# Patient Record
Sex: FEMALE | Race: WHITE | NOT HISPANIC OR LATINO | Employment: OTHER | URBAN - NONMETROPOLITAN AREA
[De-identification: names, ages, dates, MRNs, and addresses within clinical notes are randomized per-mention and may not be internally consistent; named-entity substitution may affect disease eponyms.]

---

## 2023-08-31 ENCOUNTER — OFFICE VISIT (OUTPATIENT)
Dept: CARDIOLOGY | Facility: CLINIC | Age: 73
End: 2023-08-31
Payer: MEDICARE

## 2023-08-31 VITALS
BODY MASS INDEX: 35.75 KG/M2 | WEIGHT: 201.8 LBS | SYSTOLIC BLOOD PRESSURE: 157 MMHG | DIASTOLIC BLOOD PRESSURE: 79 MMHG | RESPIRATION RATE: 18 BRPM | HEIGHT: 63 IN | OXYGEN SATURATION: 95 % | HEART RATE: 54 BPM

## 2023-08-31 DIAGNOSIS — I10 PRIMARY HYPERTENSION: Primary | ICD-10-CM

## 2023-08-31 DIAGNOSIS — R00.1 BRADYCARDIA, SINUS: ICD-10-CM

## 2023-08-31 DIAGNOSIS — R60.0 LOWER EXTREMITY EDEMA: ICD-10-CM

## 2023-08-31 PROCEDURE — 3078F DIAST BP <80 MM HG: CPT | Performed by: PHYSICIAN ASSISTANT

## 2023-08-31 PROCEDURE — 93000 ELECTROCARDIOGRAM COMPLETE: CPT | Performed by: PHYSICIAN ASSISTANT

## 2023-08-31 PROCEDURE — 99204 OFFICE O/P NEW MOD 45 MIN: CPT | Performed by: PHYSICIAN ASSISTANT

## 2023-08-31 PROCEDURE — 3077F SYST BP >= 140 MM HG: CPT | Performed by: PHYSICIAN ASSISTANT

## 2023-08-31 RX ORDER — ALPRAZOLAM 0.5 MG/1
1 TABLET ORAL EVERY 12 HOURS SCHEDULED
COMMUNITY
Start: 2023-08-13

## 2023-08-31 RX ORDER — NEBIVOLOL 10 MG/1
10 TABLET ORAL EVERY 12 HOURS SCHEDULED
COMMUNITY
Start: 2023-06-13

## 2023-08-31 RX ORDER — LANOLIN ALCOHOL/MO/W.PET/CERES
500 CREAM (GRAM) TOPICAL DAILY
COMMUNITY
Start: 2023-07-03

## 2023-08-31 RX ORDER — HYDRALAZINE HYDROCHLORIDE 50 MG/1
1 TABLET, FILM COATED ORAL EVERY 12 HOURS SCHEDULED
COMMUNITY
Start: 2023-06-09

## 2023-08-31 RX ORDER — LISINOPRIL 20 MG/1
1 TABLET ORAL EVERY 12 HOURS SCHEDULED
COMMUNITY
Start: 2023-06-09

## 2023-08-31 RX ORDER — TRAMADOL HYDROCHLORIDE 50 MG/1
50 TABLET ORAL EVERY 6 HOURS PRN
COMMUNITY
Start: 2023-08-12

## 2023-08-31 RX ORDER — HYDRALAZINE HYDROCHLORIDE 25 MG/1
1 TABLET, FILM COATED ORAL EVERY 12 HOURS SCHEDULED
COMMUNITY
Start: 2023-06-16

## 2023-08-31 RX ORDER — SOLIFENACIN SUCCINATE 10 MG/1
10 TABLET, FILM COATED ORAL DAILY
COMMUNITY
Start: 2023-06-29

## 2023-08-31 NOTE — PROGRESS NOTES
Subjective   Leticia Diaz is a 73 y.o. female     Chief Complaint   Patient presents with    Hypertension       HPI    Patient is a 54-year-old female who presents to the office to be evaluated.    Patient is doing remarkably well.  She recently moved to the area approximately a year and a half ago.  She is from [place].  She seen cardiology out there and she brings records in which she had a stress test showing no evidence of ischemia and preserved LV function.    She has had issues with her blood pressure and is on a regimen to include Bystolic, lisinopril and hydralazine.  It appears that she has tried other medications in the past.  Hydralazine seems to have worked in regards to regulating her blood pressure at least helping it.  She describes that she has some issues with pain and symptoms that will increase her blood pressure but she overall is doing well.    She has no chest pain or pressure.  She recently describes having influenza but does not describe any severe or progressive shortness of breath.  No PND or orthopnea.    She does not describe palpitations nor does she complain of dysrhythmic symptoms.  She otherwise is stable.      Current Outpatient Medications   Medication Sig Dispense Refill    ALPRAZolam (XANAX) 0.5 MG tablet Take 1 tablet by mouth Every 12 (Twelve) Hours.      hydrALAZINE (APRESOLINE) 25 MG tablet Take 1 tablet by mouth Every 12 (Twelve) Hours.      hydrALAZINE (APRESOLINE) 50 MG tablet Take 1 tablet by mouth Every 12 (Twelve) Hours.      lisinopril (PRINIVIL,ZESTRIL) 20 MG tablet Take 1 tablet by mouth Every 12 (Twelve) Hours.      Multiple Vitamins-Minerals (Multivitamin Adult, Minerals,) tablet Take 1 tablet by mouth Daily.      nebivolol (BYSTOLIC) 10 MG tablet Take 1 tablet by mouth Every 12 (Twelve) Hours.      solifenacin (VESICARE) 10 MG tablet Take 1 tablet by mouth Daily.      traMADol (ULTRAM) 50 MG tablet Take 1 tablet by mouth Every 6 (Six) Hours As Needed.       "vitamin B-12 (CYANOCOBALAMIN) 1000 MCG tablet Take 0.5 tablets by mouth Daily.       No current facility-administered medications for this visit.       Edarbi [azilsartan] and Nitrolingual [nitroglycerin]    Past Medical History:   Diagnosis Date    Bunion     Gastric bypass status for obesity     Kidney stones        Social History     Socioeconomic History    Marital status:    Tobacco Use    Smoking status: Never    Smokeless tobacco: Never   Vaping Use    Vaping Use: Never used   Substance and Sexual Activity    Alcohol use: Yes     Alcohol/week: 1.0 standard drink     Types: 1 Glasses of wine per week     Comment: occasionally    Drug use: Never    Sexual activity: Defer       Family History   Problem Relation Age of Onset    Hypertension Mother     Cancer Father     Hypertension Maternal Aunt     Hypertension Maternal Uncle     Hypertension Maternal Grandmother     Hypertension Maternal Grandfather        Review of Systems   Constitutional:  Positive for chills and fatigue (flue recently).   HENT: Negative.     Eyes: Negative.    Respiratory: Negative.     Cardiovascular: Negative.    Gastrointestinal: Negative.    Endocrine: Negative.    Genitourinary:  Positive for difficulty urinating, frequency and urgency.   Musculoskeletal:  Positive for arthralgias.   Skin: Negative.    Allergic/Immunologic: Negative.    Neurological: Negative.    Hematological: Negative.    Psychiatric/Behavioral: Negative.       Objective   Vitals:    08/31/23 1100   BP: 157/79   BP Location: Left arm   Patient Position: Sitting   Cuff Size: Adult   Pulse: 54   Resp: 18   SpO2: 95%   Weight: 91.5 kg (201 lb 12.8 oz)   Height: 158.8 cm (62.5\")      /79 (BP Location: Left arm, Patient Position: Sitting, Cuff Size: Adult)   Pulse 54   Resp 18   Ht 158.8 cm (62.5\")   Wt 91.5 kg (201 lb 12.8 oz)   SpO2 95%   BMI 36.32 kg/mý     Lab Results (most recent)       None            Physical Exam  Vitals and nursing note " reviewed.   Constitutional:       General: She is not in acute distress.     Appearance: Normal appearance. She is well-developed.   HENT:      Head: Normocephalic and atraumatic.   Eyes:      General: No scleral icterus.        Right eye: No discharge.         Left eye: No discharge.      Conjunctiva/sclera: Conjunctivae normal.   Neck:      Vascular: No carotid bruit.   Cardiovascular:      Rate and Rhythm: Normal rate and regular rhythm.      Heart sounds: Normal heart sounds. No murmur heard.    No friction rub. No gallop.   Pulmonary:      Effort: Pulmonary effort is normal. No respiratory distress.      Breath sounds: Normal breath sounds. No wheezing or rales.   Chest:      Chest wall: No tenderness.   Musculoskeletal:      Right lower leg: Edema present.      Left lower leg: Edema present.   Skin:     General: Skin is warm and dry.      Coloration: Skin is not pale.      Findings: No erythema or rash.   Neurological:      Mental Status: She is alert and oriented to person, place, and time.      Cranial Nerves: No cranial nerve deficit.   Psychiatric:         Behavior: Behavior normal.       Procedure     ECG 12 Lead    Date/Time: 8/31/2023 11:12 AM  Performed by: Solomon Mason PA  Authorized by: Solomon Mason PA   Comparison: not compared with previous ECG   Comments: EKG demonstrates sinus bradycardia 52 bpm, left axis deviation, possible septal wall MI age undetermined and no acute ST changes             Assessment & Plan     Problems Addressed this Visit          Cardiac and Vasculature    Bradycardia, sinus    Relevant Medications    nebivolol (BYSTOLIC) 10 MG tablet    Primary hypertension - Primary    Relevant Medications    nebivolol (BYSTOLIC) 10 MG tablet    hydrALAZINE (APRESOLINE) 25 MG tablet    hydrALAZINE (APRESOLINE) 50 MG tablet    lisinopril (PRINIVIL,ZESTRIL) 20 MG tablet       Symptoms and Signs    Lower extremity edema     Diagnoses         Codes Comments    Primary hypertension     -  Primary ICD-10-CM: I10  ICD-9-CM: 401.9     Lower extremity edema     ICD-10-CM: R60.0  ICD-9-CM: 782.3     Bradycardia, sinus     ICD-10-CM: R00.1  ICD-9-CM: 427.89               Advance Care Planning   ACP discussion was held with the patient during this visit. Patient has an advance directive (not in EMR), copy requested.      Recommendation  1.  Patient is a 73-year-old female who presents to the office to be evaluated.  She has done relatively well.  She has no angina no anginal equivalent symptoms.  For now, with recent stress test within the last year showing no evidence of ischemia, we will continue medical therapy.    2. Patient with baseline hypertension.  It is slightly elevated today but she is monitoring closely at home.  We will continue current medical regimen.    3.  She does have bradycardia noted today but likely from the Bystolic.  She does not describe symptoms of cerebral hypoperfusion.  We will monitor.    4.  Patient with very trace edema on exam.  I feel it is likely related to venous insufficiency.  We will monitor for now.  We can see him back for follow-up in a year or sooner as symptoms discussed.  Follow-up with primary as scheduled.         Leticia Diaz  reports that she has never smoked. She has never used smokeless tobacco.. I have educated her on the risk of diseases from using tobacco             Electronically signed by:

## 2023-08-31 NOTE — LETTER
August 31, 2023       No Recipients    Patient: Leticia Diaz   YOB: 1950   Date of Visit: 8/31/2023       Dear Colby Hinton MD    Leticia Diaz was in my office today. Below is a copy of my note.    If you have questions, please do not hesitate to call me. I look forward to following Leticia along with you.         Sincerely,        BENITA Fish        CC:   No Recipients    Subjective  Leticia Diaz is a 73 y.o. female     Chief Complaint   Patient presents with    Hypertension       HPI    Patient is a 54-year-old female who presents to the office to be evaluated.    Patient is doing remarkably well.  She recently moved to the area approximately a year and a half ago.  She is from [place].  She seen cardiology out there and she brings records in which she had a stress test showing no evidence of ischemia and preserved LV function.    She has had issues with her blood pressure and is on a regimen to include Bystolic, lisinopril and hydralazine.  It appears that she has tried other medications in the past.  Hydralazine seems to have worked in regards to regulating her blood pressure at least helping it.  She describes that she has some issues with pain and symptoms that will increase her blood pressure but she overall is doing well.    She has no chest pain or pressure.  She recently describes having influenza but does not describe any severe or progressive shortness of breath.  No PND or orthopnea.    She does not describe palpitations nor does she complain of dysrhythmic symptoms.  She otherwise is stable.      Current Outpatient Medications   Medication Sig Dispense Refill    ALPRAZolam (XANAX) 0.5 MG tablet Take 1 tablet by mouth Every 12 (Twelve) Hours.      hydrALAZINE (APRESOLINE) 25 MG tablet Take 1 tablet by mouth Every 12 (Twelve) Hours.      hydrALAZINE (APRESOLINE) 50 MG tablet Take 1 tablet by mouth Every 12 (Twelve) Hours.      lisinopril (PRINIVIL,ZESTRIL) 20 MG  tablet Take 1 tablet by mouth Every 12 (Twelve) Hours.      Multiple Vitamins-Minerals (Multivitamin Adult, Minerals,) tablet Take 1 tablet by mouth Daily.      nebivolol (BYSTOLIC) 10 MG tablet Take 1 tablet by mouth Every 12 (Twelve) Hours.      solifenacin (VESICARE) 10 MG tablet Take 1 tablet by mouth Daily.      traMADol (ULTRAM) 50 MG tablet Take 1 tablet by mouth Every 6 (Six) Hours As Needed.      vitamin B-12 (CYANOCOBALAMIN) 1000 MCG tablet Take 0.5 tablets by mouth Daily.       No current facility-administered medications for this visit.       Edarbi [azilsartan] and Nitrolingual [nitroglycerin]    Past Medical History:   Diagnosis Date    Bunion     Gastric bypass status for obesity     Kidney stones        Social History     Socioeconomic History    Marital status:    Tobacco Use    Smoking status: Never    Smokeless tobacco: Never   Vaping Use    Vaping Use: Never used   Substance and Sexual Activity    Alcohol use: Yes     Alcohol/week: 1.0 standard drink     Types: 1 Glasses of wine per week     Comment: occasionally    Drug use: Never    Sexual activity: Defer       Family History   Problem Relation Age of Onset    Hypertension Mother     Cancer Father     Hypertension Maternal Aunt     Hypertension Maternal Uncle     Hypertension Maternal Grandmother     Hypertension Maternal Grandfather        Review of Systems   Constitutional:  Positive for chills and fatigue (flue recently).   HENT: Negative.     Eyes: Negative.    Respiratory: Negative.     Cardiovascular: Negative.    Gastrointestinal: Negative.    Endocrine: Negative.    Genitourinary:  Positive for difficulty urinating, frequency and urgency.   Musculoskeletal:  Positive for arthralgias.   Skin: Negative.    Allergic/Immunologic: Negative.    Neurological: Negative.    Hematological: Negative.    Psychiatric/Behavioral: Negative.       Objective  Vitals:    08/31/23 1100   BP: 157/79   BP Location: Left arm  "  Patient Position: Sitting   Cuff Size: Adult   Pulse: 54   Resp: 18   SpO2: 95%   Weight: 91.5 kg (201 lb 12.8 oz)   Height: 158.8 cm (62.5\")      /79 (BP Location: Left arm, Patient Position: Sitting, Cuff Size: Adult)   Pulse 54   Resp 18   Ht 158.8 cm (62.5\")   Wt 91.5 kg (201 lb 12.8 oz)   SpO2 95%   BMI 36.32 kg/mý     Lab Results (most recent)       None            Physical Exam  Vitals and nursing note reviewed.   Constitutional:       General: She is not in acute distress.     Appearance: Normal appearance. She is well-developed.   HENT:      Head: Normocephalic and atraumatic.   Eyes:      General: No scleral icterus.        Right eye: No discharge.         Left eye: No discharge.      Conjunctiva/sclera: Conjunctivae normal.   Neck:      Vascular: No carotid bruit.   Cardiovascular:      Rate and Rhythm: Normal rate and regular rhythm.      Heart sounds: Normal heart sounds. No murmur heard.    No friction rub. No gallop.   Pulmonary:      Effort: Pulmonary effort is normal. No respiratory distress.      Breath sounds: Normal breath sounds. No wheezing or rales.   Chest:      Chest wall: No tenderness.   Musculoskeletal:      Right lower leg: Edema present.      Left lower leg: Edema present.   Skin:     General: Skin is warm and dry.      Coloration: Skin is not pale.      Findings: No erythema or rash.   Neurological:      Mental Status: She is alert and oriented to person, place, and time.      Cranial Nerves: No cranial nerve deficit.   Psychiatric:         Behavior: Behavior normal.       Procedure    ECG 12 Lead    Date/Time: 8/31/2023 11:12 AM  Performed by: Solomon Mason PA  Authorized by: Solomon Mason PA   Comparison: not compared with previous ECG   Comments: EKG demonstrates sinus bradycardia 52 bpm, left axis deviation, possible septal wall MI age undetermined and no acute ST changes             Assessment & Plan    Problems Addressed this Visit          Cardiac and " Vasculature    Bradycardia, sinus    Relevant Medications    nebivolol (BYSTOLIC) 10 MG tablet    Primary hypertension - Primary    Relevant Medications    nebivolol (BYSTOLIC) 10 MG tablet    hydrALAZINE (APRESOLINE) 25 MG tablet    hydrALAZINE (APRESOLINE) 50 MG tablet    lisinopril (PRINIVIL,ZESTRIL) 20 MG tablet       Symptoms and Signs    Lower extremity edema     Diagnoses         Codes Comments    Primary hypertension    -  Primary ICD-10-CM: I10  ICD-9-CM: 401.9     Lower extremity edema     ICD-10-CM: R60.0  ICD-9-CM: 782.3     Bradycardia, sinus     ICD-10-CM: R00.1  ICD-9-CM: 427.89               Advance Care Planning   ACP discussion was held with the patient during this visit. Patient has an advance directive (not in EMR), copy requested.      Recommendation  1.  Patient is a 73-year-old female who presents to the office to be evaluated.  She has done relatively well.  She has no angina no anginal equivalent symptoms.  For now, with recent stress test within the last year showing no evidence of ischemia, we will continue medical therapy.    2. Patient with baseline hypertension.  It is slightly elevated today but she is monitoring closely at home.  We will continue current medical regimen.    3.  She does have bradycardia noted today but likely from the Bystolic.  She does not describe symptoms of cerebral hypoperfusion.  We will monitor.    4.  Patient with very trace edema on exam.  I feel it is likely related to venous insufficiency.  We will monitor for now.  We can see him back for follow-up in a year or sooner as symptoms discussed.  Follow-up with primary as scheduled.         Leticia Emily  reports that she has never smoked. She has never used smokeless tobacco.. I have educated her on the risk of diseases from using tobacco             Electronically signed by:

## 2024-09-03 ENCOUNTER — OFFICE VISIT (OUTPATIENT)
Dept: CARDIOLOGY | Facility: CLINIC | Age: 74
End: 2024-09-03
Payer: MEDICARE

## 2024-09-03 VITALS
HEIGHT: 63 IN | HEART RATE: 63 BPM | BODY MASS INDEX: 37.74 KG/M2 | DIASTOLIC BLOOD PRESSURE: 85 MMHG | WEIGHT: 213 LBS | SYSTOLIC BLOOD PRESSURE: 147 MMHG

## 2024-09-03 DIAGNOSIS — R60.0 LOWER EXTREMITY EDEMA: ICD-10-CM

## 2024-09-03 DIAGNOSIS — I10 PRIMARY HYPERTENSION: Primary | ICD-10-CM

## 2024-09-03 DIAGNOSIS — R06.02 SHORTNESS OF BREATH: ICD-10-CM

## 2024-09-03 PROCEDURE — 3079F DIAST BP 80-89 MM HG: CPT | Performed by: PHYSICIAN ASSISTANT

## 2024-09-03 PROCEDURE — 3077F SYST BP >= 140 MM HG: CPT | Performed by: PHYSICIAN ASSISTANT

## 2024-09-03 PROCEDURE — 99213 OFFICE O/P EST LOW 20 MIN: CPT | Performed by: PHYSICIAN ASSISTANT

## 2024-09-03 RX ORDER — NEBIVOLOL 5 MG/1
5 TABLET ORAL 2 TIMES DAILY
COMMUNITY

## 2024-09-03 RX ORDER — PREGABALIN 75 MG/1
75 CAPSULE ORAL DAILY
COMMUNITY

## 2024-09-03 RX ORDER — FUROSEMIDE 40 MG
40 TABLET ORAL DAILY
COMMUNITY

## 2024-09-03 RX ORDER — AMLODIPINE BESYLATE 10 MG/1
10 TABLET ORAL DAILY
COMMUNITY

## 2024-09-03 RX ORDER — ASPIRIN 81 MG/1
81 TABLET ORAL DAILY
COMMUNITY

## 2024-09-03 NOTE — PROGRESS NOTES
Problem list     Subjective   Leticia Diaz is a 74 y.o. female     Chief Complaint   Patient presents with    Yearly follow up     Primary hypertension       HPI      Patient is a 74-year-old female presenting back to the office for follow-up.  She has done relatively well over the last year.  She had seen cardiology approximately 3 to 4 years ago and had a stress and echocardiogram in [place].  It apparently was unremarkable.    She has been monitored through the office having moved to this area and she does remarkably well.  She has no chest pain or pressure.  She does have a degree of mild dyspnea at baseline.  No PND or orthopnea.    She does have a degree of edema.  That is managed on Lasix.  She feels relatively stable at this time.    Current Outpatient Medications on File Prior to Visit   Medication Sig Dispense Refill    ALPRAZolam (XANAX) 0.5 MG tablet Take 1 tablet by mouth Every 12 (Twelve) Hours.      amLODIPine (NORVASC) 10 MG tablet Take 1 tablet by mouth Daily.      aspirin 81 MG EC tablet Take 1 tablet by mouth Daily.      D-MANNOSE PO Take  by mouth.      furosemide (LASIX) 40 MG tablet Take 1 tablet by mouth Daily.      lisinopril (PRINIVIL,ZESTRIL) 20 MG tablet Take 1 tablet by mouth Every 12 (Twelve) Hours.      Multiple Vitamins-Minerals (Multivitamin Adult, Minerals,) tablet Take 1 tablet by mouth Daily.      nebivolol (BYSTOLIC) 10 MG tablet Take 1 tablet by mouth Every 12 (Twelve) Hours.      nebivolol (BYSTOLIC) 5 MG tablet Take 1 tablet by mouth 2 (Two) Times a Day.      pregabalin (LYRICA) 75 MG capsule Take 1 capsule by mouth Daily.      solifenacin (VESICARE) 10 MG tablet Take 1 tablet by mouth Daily.      traMADol (ULTRAM) 50 MG tablet Take 1 tablet by mouth Every 6 (Six) Hours As Needed.      vitamin B-12 (CYANOCOBALAMIN) 1000 MCG tablet Take 0.5 tablets by mouth Daily.      [DISCONTINUED] hydrALAZINE (APRESOLINE) 25 MG tablet Take 1 tablet by mouth Every 12 (Twelve) Hours.       [DISCONTINUED] hydrALAZINE (APRESOLINE) 50 MG tablet Take 1 tablet by mouth Every 12 (Twelve) Hours.       No current facility-administered medications on file prior to visit.       Edarbi [azilsartan] and Nitrolingual [nitroglycerin]    Past Medical History:   Diagnosis Date    Bunion     Gastric bypass status for obesity     Kidney stones        Social History     Socioeconomic History    Marital status:    Tobacco Use    Smoking status: Never    Smokeless tobacco: Never   Vaping Use    Vaping status: Never Used   Substance and Sexual Activity    Alcohol use: Yes     Alcohol/week: 1.0 standard drink of alcohol     Types: 1 Glasses of wine per week     Comment: occasionally    Drug use: Never    Sexual activity: Defer       Family History   Problem Relation Age of Onset    Hypertension Mother     Cancer Father     Hypertension Maternal Aunt     Hypertension Maternal Uncle     Hypertension Maternal Grandmother     Hypertension Maternal Grandfather        Review of Systems   Constitutional:  Positive for fatigue. Negative for activity change, appetite change, chills and fever.   HENT: Negative.  Negative for congestion, sinus pressure and sinus pain.    Eyes: Negative.  Negative for visual disturbance.   Respiratory: Negative.  Negative for apnea, cough, chest tightness, shortness of breath and wheezing.    Cardiovascular:  Positive for leg swelling. Negative for chest pain and palpitations.   Gastrointestinal: Negative.  Negative for blood in stool.   Endocrine: Negative.  Negative for cold intolerance and heat intolerance.   Genitourinary: Negative.  Negative for hematuria.   Musculoskeletal: Negative.  Negative for gait problem.   Skin: Negative.  Negative for color change, rash and wound.   Allergic/Immunologic: Negative.  Negative for environmental allergies and food allergies.   Neurological: Negative.  Negative for dizziness, syncope, weakness, light-headedness, numbness and headaches.   Hematological:   "Bruises/bleeds easily.   Psychiatric/Behavioral:  Negative for sleep disturbance.        Objective   Vitals:    09/03/24 1042   BP: 147/85   BP Location: Right arm   Patient Position: Sitting   Cuff Size: Adult   Pulse: 63   Weight: 96.6 kg (213 lb)   Height: 158.8 cm (62.5\")      /85 (BP Location: Right arm, Patient Position: Sitting, Cuff Size: Adult)   Pulse 63   Ht 158.8 cm (62.5\")   Wt 96.6 kg (213 lb)   BMI 38.34 kg/m²     Lab Results (most recent)       None            Physical Exam  Vitals and nursing note reviewed.   Constitutional:       General: She is not in acute distress.     Appearance: Normal appearance. She is well-developed.   HENT:      Head: Normocephalic and atraumatic.   Eyes:      General: No scleral icterus.        Right eye: No discharge.         Left eye: No discharge.      Conjunctiva/sclera: Conjunctivae normal.   Neck:      Vascular: No carotid bruit.   Cardiovascular:      Rate and Rhythm: Normal rate and regular rhythm.      Heart sounds: Normal heart sounds. No murmur heard.     No friction rub. No gallop.   Pulmonary:      Effort: Pulmonary effort is normal. No respiratory distress.      Breath sounds: Normal breath sounds. No wheezing or rales.   Chest:      Chest wall: No tenderness.   Musculoskeletal:      Right lower leg: No edema.      Left lower leg: No edema.   Skin:     General: Skin is warm and dry.      Coloration: Skin is not pale.      Findings: No erythema or rash.   Neurological:      Mental Status: She is alert and oriented to person, place, and time.      Cranial Nerves: No cranial nerve deficit.   Psychiatric:         Behavior: Behavior normal.         Procedure   Procedures       Assessment & Plan     Problems Addressed this Visit          Cardiac and Vasculature    Primary hypertension - Primary    Relevant Medications    nebivolol (BYSTOLIC) 5 MG tablet    amLODIPine (NORVASC) 10 MG tablet    furosemide (LASIX) 40 MG tablet       Pulmonary and Pneumonias "    Shortness of breath       Symptoms and Signs    Lower extremity edema     Diagnoses         Codes Comments    Primary hypertension    -  Primary ICD-10-CM: I10  ICD-9-CM: 401.9     Lower extremity edema     ICD-10-CM: R60.0  ICD-9-CM: 782.3     Shortness of breath     ICD-10-CM: R06.02  ICD-9-CM: 786.05             Recommendations  1.  Patient is a 74-year-old female presenting to the office for evaluation.  Patient has done well.  Patient has no chest pain.  She has a degree of mild dyspnea but feels stable.  She does not feel any significant concern from this standpoint.  For now, we can monitor and recommend her calling us with symptoms if they were to worsen as discussed with her.    2.  Blood pressure is slightly elevated but is being managed.  We will monitor for now.    3.  We discussed her lower extremity edema likely a side effect of her medications.  She is being managed on Lasix.  For now, she feels well.  We can see her back in a year or sooner if symptoms were to progress as discussed with her.  Follow-up with primary as scheduled.         Patient brought in medicine list to appointment, it's been reviewed with patient and med list was updated in the chart.      Advance Care Planning   ACP discussion was declined by the patient. Patient has an advance directive (not in EMR), copy requested.      Electronically signed by:

## 2024-09-03 NOTE — LETTER
September 3, 2024       No Recipients    Patient: Leticia Diaz   YOB: 1950   Date of Visit: 9/3/2024       Dear Colby Hinton MD    Leticia Diaz was in my office today. Below is a copy of my note.    If you have questions, please do not hesitate to call me. I look forward to following Leticia along with you.         Sincerely,        BENITA Fish        CC:   No Recipients    Problem list     Subjective  Leticia Diaz is a 74 y.o. female     Chief Complaint   Patient presents with   • Yearly follow up     Primary hypertension       HPI      Patient is a 74-year-old female presenting back to the office for follow-up.  She has done relatively well over the last year.  She had seen cardiology approximately 3 to 4 years ago and had a stress and echocardiogram in [place].  It apparently was unremarkable.    She has been monitored through the office having moved to this area and she does remarkably well.  She has no chest pain or pressure.  She does have a degree of mild dyspnea at baseline.  No PND or orthopnea.    She does have a degree of edema.  That is managed on Lasix.  She feels relatively stable at this time.    Current Outpatient Medications on File Prior to Visit   Medication Sig Dispense Refill   • ALPRAZolam (XANAX) 0.5 MG tablet Take 1 tablet by mouth Every 12 (Twelve) Hours.     • amLODIPine (NORVASC) 10 MG tablet Take 1 tablet by mouth Daily.     • aspirin 81 MG EC tablet Take 1 tablet by mouth Daily.     • D-MANNOSE PO Take  by mouth.     • furosemide (LASIX) 40 MG tablet Take 1 tablet by mouth Daily.     • lisinopril (PRINIVIL,ZESTRIL) 20 MG tablet Take 1 tablet by mouth Every 12 (Twelve) Hours.     • Multiple Vitamins-Minerals (Multivitamin Adult, Minerals,) tablet Take 1 tablet by mouth Daily.     • nebivolol (BYSTOLIC) 10 MG tablet Take 1 tablet by mouth Every 12 (Twelve) Hours.     • nebivolol (BYSTOLIC) 5 MG tablet Take 1 tablet by mouth 2 (Two) Times a Day.     •  pregabalin (LYRICA) 75 MG capsule Take 1 capsule by mouth Daily.     • solifenacin (VESICARE) 10 MG tablet Take 1 tablet by mouth Daily.     • traMADol (ULTRAM) 50 MG tablet Take 1 tablet by mouth Every 6 (Six) Hours As Needed.     • vitamin B-12 (CYANOCOBALAMIN) 1000 MCG tablet Take 0.5 tablets by mouth Daily.     • [DISCONTINUED] hydrALAZINE (APRESOLINE) 25 MG tablet Take 1 tablet by mouth Every 12 (Twelve) Hours.     • [DISCONTINUED] hydrALAZINE (APRESOLINE) 50 MG tablet Take 1 tablet by mouth Every 12 (Twelve) Hours.       No current facility-administered medications on file prior to visit.       Edarbi [azilsartan] and Nitrolingual [nitroglycerin]    Past Medical History:   Diagnosis Date   • Bunion    • Gastric bypass status for obesity    • Kidney stones        Social History     Socioeconomic History   • Marital status:    Tobacco Use   • Smoking status: Never   • Smokeless tobacco: Never   Vaping Use   • Vaping status: Never Used   Substance and Sexual Activity   • Alcohol use: Yes     Alcohol/week: 1.0 standard drink of alcohol     Types: 1 Glasses of wine per week     Comment: occasionally   • Drug use: Never   • Sexual activity: Defer       Family History   Problem Relation Age of Onset   • Hypertension Mother    • Cancer Father    • Hypertension Maternal Aunt    • Hypertension Maternal Uncle    • Hypertension Maternal Grandmother    • Hypertension Maternal Grandfather        Review of Systems   Constitutional:  Positive for fatigue. Negative for activity change, appetite change, chills and fever.   HENT: Negative.  Negative for congestion, sinus pressure and sinus pain.    Eyes: Negative.  Negative for visual disturbance.   Respiratory: Negative.  Negative for apnea, cough, chest tightness, shortness of breath and wheezing.    Cardiovascular:  Positive for leg swelling. Negative for chest pain and palpitations.   Gastrointestinal: Negative.  Negative for blood in stool.   Endocrine: Negative.   "Negative for cold intolerance and heat intolerance.   Genitourinary: Negative.  Negative for hematuria.   Musculoskeletal: Negative.  Negative for gait problem.   Skin: Negative.  Negative for color change, rash and wound.   Allergic/Immunologic: Negative.  Negative for environmental allergies and food allergies.   Neurological: Negative.  Negative for dizziness, syncope, weakness, light-headedness, numbness and headaches.   Hematological:  Bruises/bleeds easily.   Psychiatric/Behavioral:  Negative for sleep disturbance.        Objective  Vitals:    09/03/24 1042   BP: 147/85   BP Location: Right arm   Patient Position: Sitting   Cuff Size: Adult   Pulse: 63   Weight: 96.6 kg (213 lb)   Height: 158.8 cm (62.5\")      /85 (BP Location: Right arm, Patient Position: Sitting, Cuff Size: Adult)   Pulse 63   Ht 158.8 cm (62.5\")   Wt 96.6 kg (213 lb)   BMI 38.34 kg/m²     Lab Results (most recent)       None            Physical Exam  Vitals and nursing note reviewed.   Constitutional:       General: She is not in acute distress.     Appearance: Normal appearance. She is well-developed.   HENT:      Head: Normocephalic and atraumatic.   Eyes:      General: No scleral icterus.        Right eye: No discharge.         Left eye: No discharge.      Conjunctiva/sclera: Conjunctivae normal.   Neck:      Vascular: No carotid bruit.   Cardiovascular:      Rate and Rhythm: Normal rate and regular rhythm.      Heart sounds: Normal heart sounds. No murmur heard.     No friction rub. No gallop.   Pulmonary:      Effort: Pulmonary effort is normal. No respiratory distress.      Breath sounds: Normal breath sounds. No wheezing or rales.   Chest:      Chest wall: No tenderness.   Musculoskeletal:      Right lower leg: No edema.      Left lower leg: No edema.   Skin:     General: Skin is warm and dry.      Coloration: Skin is not pale.      Findings: No erythema or rash.   Neurological:      Mental Status: She is alert and oriented " to person, place, and time.      Cranial Nerves: No cranial nerve deficit.   Psychiatric:         Behavior: Behavior normal.         Procedure  Procedures       Assessment & Plan    Problems Addressed this Visit          Cardiac and Vasculature    Primary hypertension - Primary    Relevant Medications    nebivolol (BYSTOLIC) 5 MG tablet    amLODIPine (NORVASC) 10 MG tablet    furosemide (LASIX) 40 MG tablet       Pulmonary and Pneumonias    Shortness of breath       Symptoms and Signs    Lower extremity edema     Diagnoses         Codes Comments    Primary hypertension    -  Primary ICD-10-CM: I10  ICD-9-CM: 401.9     Lower extremity edema     ICD-10-CM: R60.0  ICD-9-CM: 782.3     Shortness of breath     ICD-10-CM: R06.02  ICD-9-CM: 786.05             Recommendations  1.  Patient is a 74-year-old female presenting to the office for evaluation.  Patient has done well.  Patient has no chest pain.  She has a degree of mild dyspnea but feels stable.  She does not feel any significant concern from this standpoint.  For now, we can monitor and recommend her calling us with symptoms if they were to worsen as discussed with her.    2.  Blood pressure is slightly elevated but is being managed.  We will monitor for now.    3.  We discussed her lower extremity edema likely a side effect of her medications.  She is being managed on Lasix.  For now, she feels well.  We can see her back in a year or sooner if symptoms were to progress as discussed with her.  Follow-up with primary as scheduled.         Patient brought in medicine list to appointment, it's been reviewed with patient and med list was updated in the chart.      Advance Care Planning  ACP discussion was declined by the patient. Patient has an advance directive (not in EMR), copy requested.      Electronically signed by:

## 2025-04-02 ENCOUNTER — TELEPHONE (OUTPATIENT)
Dept: CARDIOLOGY | Facility: CLINIC | Age: 75
End: 2025-04-02
Payer: MEDICARE

## 2025-04-02 NOTE — TELEPHONE ENCOUNTER
Caller: Leticia Diaz    Relationship to patient: Self    Best call back number: [phone]    Chief complaint: PATIENT IS STATING THEY ARE HAVING SHORTNESS OF BREATHE AND NO OTHER SYMPTOMS WOULD LIKE TO BE SEEN SOONER    Type of visit: FOLLOW-UP    Requested date: ASAP

## 2025-04-02 NOTE — TELEPHONE ENCOUNTER
Per chart review, pt last saw [facility] on 9/3/25, no mention of SoB at that appt. I see no pulm diagnoses in pt's history.       Called pt, she denies any symptoms of illness. States she gets SoB w/ increased activity, denies any more edema than her usual. I suggested that she schedule w/ PCP to R/O non-cardiac possibilities. Advised her to call back if PCP feels it's heart related.